# Patient Record
Sex: FEMALE | Race: WHITE | NOT HISPANIC OR LATINO | Employment: FULL TIME | URBAN - METROPOLITAN AREA
[De-identification: names, ages, dates, MRNs, and addresses within clinical notes are randomized per-mention and may not be internally consistent; named-entity substitution may affect disease eponyms.]

---

## 2022-02-25 ENCOUNTER — HOSPITAL ENCOUNTER (EMERGENCY)
Facility: CLINIC | Age: 22
Discharge: HOME | End: 2022-02-25
Attending: EMERGENCY MEDICINE | Admitting: EMERGENCY MEDICINE
Payer: COMMERCIAL

## 2022-02-25 VITALS
BODY MASS INDEX: 23.7 KG/M2 | WEIGHT: 160 LBS | TEMPERATURE: 97.7 F | RESPIRATION RATE: 16 BRPM | DIASTOLIC BLOOD PRESSURE: 70 MMHG | HEIGHT: 69 IN | SYSTOLIC BLOOD PRESSURE: 134 MMHG | HEART RATE: 91 BPM | OXYGEN SATURATION: 100 %

## 2022-02-25 DIAGNOSIS — G51.0 LEFT-SIDED BELL'S PALSY: ICD-10-CM

## 2022-02-25 PROCEDURE — 99284 EMERGENCY DEPT VISIT MOD MDM: CPT

## 2022-02-25 RX ORDER — PREDNISONE 20 MG/1
80 TABLET ORAL DAILY
Qty: 28 TABLET | Refills: 0 | Status: SHIPPED | OUTPATIENT
Start: 2022-02-25 | End: 2022-03-04

## 2022-02-25 RX ORDER — VALACYCLOVIR HYDROCHLORIDE 1 G/1
1000 TABLET, FILM COATED ORAL 3 TIMES DAILY
Qty: 21 TABLET | Refills: 0 | Status: SHIPPED | OUTPATIENT
Start: 2022-02-25 | End: 2022-03-04

## 2022-02-25 NOTE — ED TRIAGE NOTES
"Presented to ER with left side facial droop and eye droop. Denies any issues with legs or arms.     Pt stated this started last night around 1900. Pt stated \"at one time I had trouble getting word out\".     Dr Soler came in room and assessed pt before triage was completed.   "

## 2022-02-25 NOTE — ED PROVIDER NOTES
EMERGENCY DEPARTMENT ENCOUNTER      NAME: Dorina Wallace  AGE: 21 year old female  YOB: 2000  MRN: [id number]  EVALUATION DATE & TIME: 2/25/2022  9:54 AM    PCP: Anuel Jones    ED PROVIDER: Nehal Soler MD    Chief Complaint   Patient presents with     Neurologic Problem     left side numbness, left eye droop,          FINAL IMPRESSION:  1. Left-sided Bell's palsy          ED COURSE & MEDICAL DECISION MAKING:    Pertinent Labs & Imaging studies reviewed. (See chart for details)  21 year old female with history of anxiety, ADHD and asthma who presents to the Emergency Department for evaluation of left-sided facial muscle weakness.  Patient states that she has noticed asymmetry to the left side of her face starting yesterday, more progressive today.  On exam has classic Bell's palsy with partial cranial nerve VII palsy on the left.  No other neuro deficit or signs for CVA.  She does not have any signs of herpetic rash on exam.  Patient counseled about Bell's palsy, appropriate eye protection.  We will discharged home with prednisone, valacyclovir.    10:00 AM I met with the patient to gather history and perform an initial exam. PPE: gloves, surgical mask. We discussed plans for discharge including supportive cares, symptomatic treatment, outpatient follow up, and reasons to return to the emergency department.           At the conclusion of the encounter I discussed the results of all of the tests and the disposition. The questions were answered. The patient or family acknowledged understanding and was agreeable with the care plan.      MEDICATIONS GIVEN IN THE EMERGENCY:  Medications - No data to display    NEW PRESCRIPTIONS STARTED AT TODAY'S ER VISIT  New Prescriptions    PREDNISONE (DELTASONE) 20 MG TABLET    Take 4 tablets (80 mg) by mouth daily for 7 days    VALACYCLOVIR (VALTREX) 1000 MG TABLET    Take 1 tablet (1,000 mg) by mouth 3 times daily for 7 days           =================================================================    HPI    Patient information was obtained from: patient     Use of Intrepreter: N/A       Dorina Wallace is a 21 year old female with pertinent medical history of asthma, anxiety, and ADHD who presents for complaint of facial weakness/numbness. The patient developed left sided facial weakness and numbness around 7pm last night (~15 hrs ago). She initially noticed it because she could only make half a smile and partially close her left eyelid. She slept with the left eye covered and was unable to close the eyelid when she woke this morning. The facial numbness extends to the left side of her nose, cheek, and forehead. She denies any difficulty swallowing. She denies any other focal weakness or numbness. She reports some difficulty pronouncing a few words since symptom onset. She had a headache last night, primarily posteriorly and slightly around the left eye; headache resolved after Advil. She also reports feeling slightly short of breath which she attributes to anxiety related to her symptoms. She reports some recent stressors recently, but otherwise denies any infectious symptoms.       REVIEW OF SYSTEMS  Constitutional:  Denies fever, chills, weight loss or weakness  Eyes:  No pain, discharge, redness  Respiratory: No wheeze or cough. Positive for shortness of breath secondary to anxiety.  Cardiovascular:  No CP, palpitations  Musculoskeletal:  Denies any new muscle/joint pain, swelling or loss of function.  Skin:  Denies rash, pallor  Neurologic:  Denies focal weakness in bilateral upper or lower extremities. Positive for left facial weakness/numbness, headache (resolved).   All other systems negative unless noted in HPI.      PAST MEDICAL HISTORY:  Past Medical History:   Diagnosis Date     ADHD (attention deficit hyperactivity disorder)      Anxiety      Asthma, exercise induced        PAST SURGICAL HISTORY:  History reviewed. No pertinent  "surgical history.    CURRENT MEDICATIONS:    Prior to Admission Medications   Prescriptions Last Dose Informant Patient Reported? Taking?   albuterol (PROAIR HFA, PROVENTIL HFA, VENTOLIN HFA) 108 (90 BASE) MCG/ACT inhaler   No No   Sig: Inhale 2 puffs into the lungs every 4 hours as needed for wheezing or shortness of breath / dyspnea (prior to exercise)   atomoxetine (STRATTERA) 18 MG capsule   No No   Sig: Take 2 capsules (36 mg) by mouth daily   escitalopram (LEXAPRO) 5 MG tablet   No No   Sig: Take 3 tablets (15 mg) by mouth daily   levonorgestrel-ethinyl estradiol (AVIANE,ALESSE,LESSINA) 0.1-20 MG-MCG per tablet  Pharmacy Yes No   Sig: Take 1 tablet by mouth daily   melatonin 3 MG tablet   No No   Sig: Take 2 tablets (6 mg) by mouth At Bedtime      Facility-Administered Medications: None       ALLERGIES:  No Known Allergies    FAMILY HISTORY:  Family History   Problem Relation Age of Onset     Substance Abuse Other        SOCIAL HISTORY:  Social History     Tobacco Use     Smoking status: Light Tobacco Smoker     Types: Cigarettes     Smokeless tobacco: Never Used     Tobacco comment: was smoking 2 cigs/day prior to entering treatment   Substance Use Topics     Alcohol use: Yes     Alcohol/week: 0.0 standard drinks     Comment: light drinker since 12, more regular (3x/week) at 15, now abstinent in treatment     Drug use: Yes     Types: Marijuana        VITALS:  Patient Vitals for the past 24 hrs:   BP Temp Temp src Pulse Resp SpO2 Height Weight   02/25/22 1001 (!) 147/89 98.2  F (36.8  C) Oral 104 16 -- 1.753 m (5' 9\") 72.6 kg (160 lb)   02/25/22 1000 (!) 147/89 -- -- 97 -- 100 % -- --       PHYSICAL EXAM    General Appearance: Well-appearing, well-nourished, no acute distress, Anxious  Head:  Normocephalic  Eyes:  PERRL, conjunctiva/corneas clear, EOM's intact  ENT: Left TM unremarkable, membranes are moist without pallor  Neck:  Supple  Cardio:  Regular rate and rhythm  Pulm:  No respiratory " distress  Abdomen:  Soft, non-tender, non distended, no rebound or guarding.  Extremities: Moves all extremities normally, normal gait  Skin:  Skin warm, dry, no rashes  Neuro:  Alert and oriented ×3, moving all extremities with 5 out of 5 upper lower extremity strength, no ataxia,, no gross sensory defects, no aphasia or dysarthria. Partial palsy of left 7th cranial nerve. Incomplete closing of left eye.  Otherwise cranial nerves III through XII intact.      The creation of this record is based on the scribe s observations of the work being performed by Nehal Soler MD and the provider s statements to them. It was created on his behalf by Alesha Mitchell, a trained medical scribe. This document has been checked and approved by the attending provider.    Nehal Soler MD  Emergency Medicine  [facility] EMERGENCY ROOM  [address] [id number] [phone]  Dept: [phone]       Nehal Soler MD  02/25/22 1026

## 2023-01-22 ENCOUNTER — HEALTH MAINTENANCE LETTER (OUTPATIENT)
Age: 23
End: 2023-01-22

## 2024-02-18 ENCOUNTER — HEALTH MAINTENANCE LETTER (OUTPATIENT)
Age: 24
End: 2024-02-18

## 2025-03-09 ENCOUNTER — HEALTH MAINTENANCE LETTER (OUTPATIENT)
Age: 25
End: 2025-03-09